# Patient Record
Sex: FEMALE | Race: WHITE
[De-identification: names, ages, dates, MRNs, and addresses within clinical notes are randomized per-mention and may not be internally consistent; named-entity substitution may affect disease eponyms.]

---

## 2018-01-10 ENCOUNTER — HOSPITAL ENCOUNTER (INPATIENT)
Dept: HOSPITAL 96 - M.ERS | Age: 78
LOS: 2 days | Discharge: HOME | DRG: 683 | End: 2018-01-12
Attending: INTERNAL MEDICINE | Admitting: INTERNAL MEDICINE
Payer: MEDICARE

## 2018-01-10 VITALS — SYSTOLIC BLOOD PRESSURE: 118 MMHG | DIASTOLIC BLOOD PRESSURE: 58 MMHG

## 2018-01-10 VITALS — SYSTOLIC BLOOD PRESSURE: 116 MMHG | DIASTOLIC BLOOD PRESSURE: 35 MMHG

## 2018-01-10 VITALS — HEIGHT: 60.98 IN | BODY MASS INDEX: 28.7 KG/M2 | WEIGHT: 152 LBS

## 2018-01-10 VITALS — SYSTOLIC BLOOD PRESSURE: 105 MMHG | DIASTOLIC BLOOD PRESSURE: 43 MMHG

## 2018-01-10 DIAGNOSIS — Z86.73: ICD-10-CM

## 2018-01-10 DIAGNOSIS — D72.829: ICD-10-CM

## 2018-01-10 DIAGNOSIS — Z79.899: ICD-10-CM

## 2018-01-10 DIAGNOSIS — Z79.82: ICD-10-CM

## 2018-01-10 DIAGNOSIS — D64.9: ICD-10-CM

## 2018-01-10 DIAGNOSIS — E03.9: ICD-10-CM

## 2018-01-10 DIAGNOSIS — E86.0: ICD-10-CM
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
## 2018-01-10 DIAGNOSIS — Z88.5: ICD-10-CM

## 2018-01-10 DIAGNOSIS — I10: ICD-10-CM

## 2018-01-10 DIAGNOSIS — R65.10: ICD-10-CM

## 2018-01-10 DIAGNOSIS — R61: ICD-10-CM
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
## 2018-01-10 DIAGNOSIS — I42.2: ICD-10-CM

## 2018-01-10 DIAGNOSIS — N17.9: Primary | ICD-10-CM
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
## 2018-01-10 DIAGNOSIS — Z90.710: ICD-10-CM

## 2018-01-10 DIAGNOSIS — I65.29: ICD-10-CM

## 2018-01-10 LAB
ABSOLUTE BASOPHILS: 0.2 THOU/UL (ref 0–0.2)
ABSOLUTE EOSINOPHILS: 0 THOU/UL (ref 0–0.7)
ABSOLUTE MONOCYTES: 1 THOU/UL (ref 0–1.2)
ALBUMIN SERPL-MCNC: 4.4 G/DL (ref 3.4–5)
ALP SERPL-CCNC: 56 U/L (ref 46–116)
ALT SERPL-CCNC: 27 U/L (ref 30–65)
ANION GAP SERPL CALC-SCNC: 11 MMOL/L (ref 7–16)
AST SERPL-CCNC: 31 U/L (ref 15–37)
BASOPHILS NFR BLD AUTO: 1.3 %
BILIRUB SERPL-MCNC: 0.6 MG/DL
BILIRUB UR-MCNC: NEGATIVE MG/DL
BUN SERPL-MCNC: 61 MG/DL (ref 7–18)
CALCIUM SERPL-MCNC: 10.2 MG/DL (ref 8.5–10.1)
CHLORIDE SERPL-SCNC: 102 MMOL/L (ref 98–107)
CO2 SERPL-SCNC: 28 MMOL/L (ref 21–32)
COLOR UR: YELLOW
CREAT SERPL-MCNC: 1.4 MG/DL (ref 0.6–1.3)
EOSINOPHIL NFR BLD: 0.3 %
GLUCOSE SERPL-MCNC: 111 MG/DL (ref 70–99)
GRANULOCYTES NFR BLD MANUAL: 65.4 %
HCT VFR BLD CALC: 35.3 % (ref 37–47)
HGB BLD-MCNC: 11.9 GM/DL (ref 12–15)
HYALINE CASTS #/AREA URNS LPF: (no result) /LPF
KETONES UR STRIP-MCNC: NEGATIVE MG/DL
LIPASE: 358 U/L (ref 73–393)
LYMPHOCYTES # BLD: 2.9 THOU/UL (ref 0.8–5.3)
LYMPHOCYTES NFR BLD AUTO: 24.8 %
MCH RBC QN AUTO: 31.3 PG (ref 26–34)
MCHC RBC AUTO-ENTMCNC: 33.7 G/DL (ref 28–37)
MCV RBC: 92.8 FL (ref 80–100)
MONOCYTES NFR BLD: 8.2 %
MPV: 9.9 FL. (ref 7.2–11.1)
NEUTROPHILS # BLD: 7.7 THOU/UL (ref 1.6–8.1)
NUCLEATED RBCS: 0 /100WBC
PLATELET COUNT*: 172 THOU/UL (ref 150–400)
POTASSIUM SERPL-SCNC: 3.9 MMOL/L (ref 3.5–5.1)
PROT SERPL-MCNC: 7.4 G/DL (ref 6.4–8.2)
PROT UR QL STRIP: NEGATIVE
RBC # BLD AUTO: 3.8 MIL/UL (ref 4.2–5)
RBC # UR STRIP: NEGATIVE /UL
RDW-CV: 13.6 % (ref 10.5–14.5)
SODIUM SERPL-SCNC: 141 MMOL/L (ref 136–145)
SP GR UR STRIP: 1.01 (ref 1–1.03)
SQUAMOUS: (no result) /LPF (ref 0–3)
URINE CLARITY: CLEAR
URINE GLUCOSE-RANDOM: NEGATIVE
URINE LEUKOCYTES-REFLEX: (no result)
URINE NITRITE-REFLEX: NEGATIVE
URINE WBC-REFLEX: (no result) /HPF (ref 0–5)
UROBILINOGEN UR STRIP-ACNC: 0.2 E.U./DL (ref 0.2–1)
WBC # BLD AUTO: 11.8 THOU/UL (ref 4–11)

## 2018-01-10 NOTE — 2DMMODE
[place] [address]
Phone:  [phone] 2 D/M-MODE ECHOCARDIOGRAM     
_______________________________________________________________________________
 
Name:         EDUARD LEMONS                 Room:          [address] [place] 
[facility]#:    [id number]     Account #:     [id number]  
Admission:    01/10/18    Attend Phys:   Power Souza, 
Discharge:                Date of Birth: [birth year]  
Date of Service: 01/10/18 1656  Report #:      [id number]
        [id number]
_______________________________________________________________________________
THIS REPORT FOR:  //name//                      
 
 
--------------- APPROVED REPORT --------------
 
 
Study performed:  01/10/2018 15:04:19
 
EXAM: Comprehensive 2D, Doppler, and color-flow 
Echocardiogram 
Patient Location: In-Patient   
Room #:  ER     Status:  routine
 
      BSA:         1.64
HR: 79 bpm BP:          104/40 mmHg 
Rhythm: NSR     
 
Other Information 
Study Quality: Good
 
Indications
Syncope 
 
2D Dimensions
IVSd:  18.64 (7-11mm) LVOT Diam:  18.66 (18-24mm) 
LVDd:  37.78 mm  
PWd:  10.81 (7-11mm) Ascending Ao:  32.20 (22-36mm)
LVDs:  17.54 (25-40mm) 
Aortic Root:  33.52 mm 
 
Volumes
Left Atrial Volume (Systole) 
    LA ESV Index:  38.50 mL/m2
 
Mitral Valve
MV Mean Gr.:  5.42 mmHg  E/A Ratio:  0.56
    MV Decel. Time:  403.80 ms
MV E Max Kp.:  1.11 m/s 
MV PHT:  117.10 ms  
MVA (PHT):  1.88 cm2  
 
TDI
E/Lateral E':  18.50 E/Medial E':  22.20
   Medial E' Kp.:  0.05 m/s
   Lateral E' Kp.:  0.06 m/s
 
 
 
[place] [address]
Phone:  [phone]                     2 D/M-MODE ECHOCARDIOGRAM     
_______________________________________________________________________________
 
Name:         EDUARD LEMONS                 Room:          [address] [place] 
[facility].#:    [id number]     Account #:     [id number]  
Admission:    01/10/18    Attend Phys:   Power Souza, 
Discharge:                Date of Birth: [birth year]  
Date of Service: 01/10/18 1656  Report #:      [id number]
        [id number]
_______________________________________________________________________________
Pulmonary Valve
PV Peak Kp.:  0.86 m/s PV Peak Gr.:  2.95 mmHg
 
Tricuspid Valve
TR Peak Gr.:  34.20 mmHg RVSP:  39.00 mmHg
 
Left Ventricle
The left ventricle is normal size. There is normal LV segmental wall 
motion. Moderate basal septal hypertrophy is present. Left 
ventricular outflow tract gradient is present. Moderate basal septal 
hypertrophy is present. Left ventricular systolic function is normal. 
The left ventricular ejection fraction is within the normal range. 
Left ventricular outflow tract gradient of 60 mm Hg LVEF is &gt;70%. 
Grade I - abnormal relaxation pattern.
 
Right Ventricle
The right ventricle is normal size. The right ventricular systolic 
function is normal.
 
Atria
Left atrium is mildly dilated. The right atrium size is 
normal.
 
Aortic Valve
Mild aortic valve sclerosis. No aortic regurgitation is present. 
There is no aortic valvular stenosis.
 
Mitral Valve
There is mitral annular calcification. There is systolic anterior 
motion of the mitral valve. Mild mitral regurgitation. No evidence of 
mitral valve stenosis.
 
Tricuspid Valve
The tricuspid valve is normal in structure. Mild tricuspid 
regurgitation. The RVSP is 35-40 mmHg.
 
Pulmonic Valve
The pulmonary valve is normal in structure. Trace pulmonic 
regurgitation.
 
Great Vessels
The aortic root is normal in size. IVC is normal in size and 
collapses with &gt;50% inspiration
 
Pericardium
There is no pericardial effusion.
 
 
[place] [address]
Phone:  [phone]                     2 D/M-MODE ECHOCARDIOGRAM     
_______________________________________________________________________________
 
Name:         EDUARD LEMONS                 Room:          [address] [place] 
[facility]#:    [id number]     Account #:     [id number]  
Admission:    01/10/18    Attend Phys:   Power Souza, 
Discharge:                Date of Birth: [birth year]  
Date of Service: 01/10/18 1656  Report #:      [id number]
        [id number]
_______________________________________________________________________________
 
&lt;Conclusion&gt;
Findings consistent with a hypertrophic cardiomyopathy
Moderate basal septal hypertrophy is present.
Left ventricular outflow tract gradient is present.
Left ventricular outflow tract gradient of 60 mm Hg
Left atrium is mildly dilated.
Mild aortic valve sclerosis.
Mild mitral regurgitation.
There is mitral annular calcification.
There is systolic anterior motion of the mitral valve.
Mild tricuspid regurgitation.
The RVSP is 35-40 mmHg.
 
 
 
 
 
 
 
 
 
 
 
 
 
 
 
 
 
 
 
 
 
 
 
 
 
 
 
 
 
 
 
  <ELECTRONICALLY SIGNED>
                                           By: Bhavin Scott MD, FACC      
  01/10/18     1656
D: 01/10/18 1656   _____________________________________
T: 01/10/18 1656   Bhavin Scott MD, [facility]        /INF

## 2018-01-10 NOTE — EKG
[place] [address]
Phone:  [phone]                     ELECTROCARDIOGRAM REPORT      
_______________________________________________________________________________
 
Name:       EDUARD LEMONS                  Room:           [address]    [place]M [place]  
.R.#:  [id number]      Account #:      [id number]  
Admission:  01/10/18     Attend Phys:    Power Souza MD 
Discharge:               Date of Birth:  [birth year]  
         Report #: [id number]
    [id number]
_______________________________________________________________________________
THIS REPORT FOR:  //name//                      
 
                         [facility] ED
                                       
Test Date:    2018-01-10               Test Time:    11:50:41
Pat Name:     EDUARD VESTA              Department:   
Patient ID:   [id number]            Room:         [facility]
Gender:       F                        Technician:   VICKIE YU
:          1940               Requested By: Alley Myers
Order Number: [id number] MD:   Bhavin Scott
                                 Measurements
Intervals                              Axis          
Rate:         87                       P:            48
RI:           162                      QRS:          -17
QRSD:         93                       T:            138
QT:           368                                    
QTc:          443                                    
                           Interpretive Statements
Sinus rhythm
Atrial premature complexes in couplets
LVH with secondary repolarization abnormality
No previous ECG available for comparison
 
Electronically Signed On 1- 16:24:48 CST by Bhavin Scott
[email or web address]
 
 
 
 
 
 
 
 
 
 
 
 
 
 
 
 
 
 
  <ELECTRONICALLY SIGNED>
                                           By: Bhavin Scott MD, [facility]      
  01/10/18     1624
D: 01/10/18 1150   _____________________________________
T: 01/10/18 1150   Bhavin Scott MD, [facility]        /EPI

## 2018-01-11 VITALS — DIASTOLIC BLOOD PRESSURE: 44 MMHG | SYSTOLIC BLOOD PRESSURE: 112 MMHG

## 2018-01-11 VITALS — DIASTOLIC BLOOD PRESSURE: 38 MMHG | SYSTOLIC BLOOD PRESSURE: 105 MMHG

## 2018-01-11 VITALS — SYSTOLIC BLOOD PRESSURE: 95 MMHG | DIASTOLIC BLOOD PRESSURE: 33 MMHG

## 2018-01-11 VITALS — DIASTOLIC BLOOD PRESSURE: 48 MMHG | SYSTOLIC BLOOD PRESSURE: 142 MMHG

## 2018-01-11 VITALS — DIASTOLIC BLOOD PRESSURE: 43 MMHG | SYSTOLIC BLOOD PRESSURE: 115 MMHG

## 2018-01-11 VITALS — SYSTOLIC BLOOD PRESSURE: 105 MMHG | DIASTOLIC BLOOD PRESSURE: 38 MMHG

## 2018-01-11 VITALS — SYSTOLIC BLOOD PRESSURE: 107 MMHG | DIASTOLIC BLOOD PRESSURE: 36 MMHG

## 2018-01-11 LAB
ABSOLUTE BASOPHILS: 0 THOU/UL (ref 0–0.2)
ABSOLUTE EOSINOPHILS: 0.1 THOU/UL (ref 0–0.7)
ABSOLUTE MONOCYTES: 0.5 THOU/UL (ref 0–1.2)
ANION GAP SERPL CALC-SCNC: 4 MMOL/L (ref 7–16)
BASOPHILS NFR BLD AUTO: 0.5 %
BUN SERPL-MCNC: 39 MG/DL (ref 7–18)
CALCIUM SERPL-MCNC: 8.2 MG/DL (ref 8.5–10.1)
CHLORIDE SERPL-SCNC: 111 MMOL/L (ref 98–107)
CO2 SERPL-SCNC: 28 MMOL/L (ref 21–32)
CREAT SERPL-MCNC: 1 MG/DL (ref 0.6–1.3)
EOSINOPHIL NFR BLD: 2.2 %
GLUCOSE SERPL-MCNC: 99 MG/DL (ref 70–99)
GRANULOCYTES NFR BLD MANUAL: 55.6 %
HCT VFR BLD CALC: 24.1 % (ref 37–47)
HGB BLD-MCNC: 8.3 GM/DL (ref 12–15)
IRON SERPL-MCNC: 44 UG/DL (ref 50–175)
LYMPHOCYTES # BLD: 2.1 THOU/UL (ref 0.8–5.3)
LYMPHOCYTES NFR BLD AUTO: 33.7 %
MCH RBC QN AUTO: 31.9 PG (ref 26–34)
MCHC RBC AUTO-ENTMCNC: 34.5 G/DL (ref 28–37)
MCV RBC: 92.3 FL (ref 80–100)
MONOCYTES NFR BLD: 8 %
MPV: 9.4 FL. (ref 7.2–11.1)
NEUTROPHILS # BLD: 3.5 THOU/UL (ref 1.6–8.1)
NUCLEATED RBCS: 0 /100WBC
PLATELET COUNT*: 96 THOU/UL (ref 150–400)
POTASSIUM SERPL-SCNC: 4 MMOL/L (ref 3.5–5.1)
RBC # BLD AUTO: 2.61 MIL/UL (ref 4.2–5)
RDW-CV: 13.3 % (ref 10.5–14.5)
SAO2 % BLD FROM PO2: 23 % (ref 20–39)
SODIUM SERPL-SCNC: 143 MMOL/L (ref 136–145)
TROPONIN-I LEVEL: <0.06 NG/ML (ref ?–0.06)
WBC # BLD AUTO: 6.3 THOU/UL (ref 4–11)

## 2018-01-11 NOTE — CON
[address]  [address]                    CONSULTATION                  
_______________________________________________________________________________
 
Name:       DEUARD LEMONS                  Room:           [address]    [place]  
M.R.#:  [id number]      Account #:      [id number]  
Admission:  01/10/18     Attend Phys:    Power Souza MD 
Discharge:               Date of Birth:  [birth year]  
         Report #: [id number]
                                                                     [id number]  
_______________________________________________________________________________
THIS REPORT FOR:  //name//                      
 
CC: Power Alex
 
DATE OF SERVICE:  01/10/2018
 
 
HISTORY OF PRESENT ILLNESS:  The patient is a 77-year-old  white female
who I was asked to see in the hospital after she had a syncopal spell.  The
patient states that she has been told she had a heart murmur for years. 
However, she has never seen a cardiologist.  She stays fairly active.  She was
doing well until this morning she woke up at 5 in the morning, she felt sweaty
and weak.  She started walking through her room, but felt lightheaded, had to
sit down in a chair.  She felt nauseated, but did not vomit.  Stomach felt
somewhat uncomfortable.  She went back to bed.  She did get up to the bath about
mid day, sat on the toilet.  She felt diaphoretic and then lost conscious.  She
apparently fell to the floor and hit her head.  Her  got her back to bed.
 She was taken by her  to see her primary care physician.  He sent her to
the Emergency Room for further evaluation and treatment.  She denied any recent
vomiting, bleeding.  She denied any chest pain, seizure activity.  She does note
occasional flutter in her chest.
 
PAST MEDICAL HISTORY:  Significant for appendectomy, hysterectomy,
cholecystectomy, tonsillectomy, cataract extraction.  She apparently had a
stroke in the past with some left-sided weakness, has been on Plavix.  She has a
history of hyperlipidemia.  No history of hypertension.
 
MEDICATIONS:  Consists of Lipitor, Atacand, HCT, aspirin, Synthroid.
 
ALLERGIES:  She has intolerance TO CODEINE and MORPHINE.
 
FAMILY HISTORY:  Her brother  of heart attack.
 
SOCIAL HISTORY:  She is .  She and her  live in [place]. 
Quit smoking years ago.  No alcohol abuse.
 
REVIEW OF SYSTEMS:  She has had no history of asthma, peptic ulcer disease,
liver disease, kidney disease, cancer, psychiatric illness.
 
PHYSICAL EXAMINATION:
GENERAL:  Revealed an elderly female lying in bed.  She appeared in no distress.
VITAL SIGNS:  She had a blood pressure of 120/60, pulse is 80, she is afebrile.
HEENT:  She is anicteric, conjunctiva pink.  Mucous members moist.
NECK:  Veins do not appear distended.  There are bilateral ____ murmur noted in
 
 
 
[place] [address]                    CONSULTATION                  
_______________________________________________________________________________
 
Name:       EDUARD LEMONS                  Room:           [address] [place]  
[facility]#:  [id number]      Account #:      [id number]  
Admission:  01/10/18     Attend Phys:    Power Souza MD 
Discharge:               Date of Birth:  [birth year]  
         Report #: [id number]
                                                                     [id number]  
_______________________________________________________________________________
both carotids.  Neck is supple.
CHEST:  Clear to auscultation.
CARDIAC:  Regular rate with grade 3 holosystolic murmur at the apex.
ABDOMEN:  Soft.
EXTREMITIES:  Had no edema.  Dorsalis pedis pulse 1+ bilaterally.
SKIN:  Cool and dry.
NEUROLOGIC:  Nonfocal.
LYMPH:  No adenopathy.
MUSCULOSKELETAL:  No joint effusions.
 
LABORATORY DATA:  Her ECG showed a sinus rhythm, left ventricular hypertrophy
with repolarization changes.
 
Workup in the Emergency Room today, she had portable chest x-ray that showed
normal heart size and clear lung fields.  CT scan of the head done without
contrast showed some volume loss, white matter changes, old infarction in the
right basal ganglia.  Previous carotid Doppler study in  showed a calcified
plaque, 50-60% stenosis.
 
Her workup in the Emergency Room today, sodium 141, creatinine 1.4, BUN 61. 
Liver function studies were normal.  TSH 0.98.  White blood cell count 9.8,
hemoglobin 11.9.
 
IMPRESSION AND RECOMMENDATIONS:
1.  Syncope.  Reason unclear.  I would monitor the patient.  No history to
suggest seizure.
2.  Murmur of mitral regurgitation.  Recommend echocardiogram.
3.  Carotid bruit.  Recommend Doppler.
4.  Hypertension.  The patient has been on an ARB and diuretic.
5.  Hyperlipidemia.  The patient is on a statin drug.
6.  Previous stroke.  The patient is on Plavix.
 
 
 
 
 
 
 
 
 
 
 
 
 
<ELECTRONICALLY SIGNED>
                                        By:  Bhavin Scott MD, FACC      
[birth year]     1936
D: 01/10/18 1609_______________________________________
T: 01/10/18 2031Dnhung Scott MD, FACC         /nt

## 2018-01-12 VITALS — DIASTOLIC BLOOD PRESSURE: 35 MMHG | SYSTOLIC BLOOD PRESSURE: 116 MMHG

## 2018-01-12 VITALS — SYSTOLIC BLOOD PRESSURE: 116 MMHG | DIASTOLIC BLOOD PRESSURE: 35 MMHG

## 2018-01-12 VITALS — DIASTOLIC BLOOD PRESSURE: 61 MMHG | SYSTOLIC BLOOD PRESSURE: 136 MMHG

## 2018-01-12 VITALS — SYSTOLIC BLOOD PRESSURE: 122 MMHG | DIASTOLIC BLOOD PRESSURE: 36 MMHG

## 2018-01-12 LAB
ABSOLUTE BASOPHILS: 0 THOU/UL (ref 0–0.2)
ABSOLUTE EOSINOPHILS: 0.2 THOU/UL (ref 0–0.7)
ABSOLUTE MONOCYTES: 0.4 THOU/UL (ref 0–1.2)
ANION GAP SERPL CALC-SCNC: 7 MMOL/L (ref 7–16)
BASOPHILS NFR BLD AUTO: 0.7 %
BUN SERPL-MCNC: 25 MG/DL (ref 7–18)
CALCIUM SERPL-MCNC: 8 MG/DL (ref 8.5–10.1)
CHLORIDE SERPL-SCNC: 112 MMOL/L (ref 98–107)
CO2 SERPL-SCNC: 25 MMOL/L (ref 21–32)
CREAT SERPL-MCNC: 0.9 MG/DL (ref 0.6–1.3)
EOSINOPHIL NFR BLD: 3.2 %
GLUCOSE SERPL-MCNC: 109 MG/DL (ref 70–99)
GRANULOCYTES NFR BLD MANUAL: 58.4 %
HCT VFR BLD CALC: 23.1 % (ref 37–47)
HGB BLD-MCNC: 8 GM/DL (ref 12–15)
LYMPHOCYTES # BLD: 1.7 THOU/UL (ref 0.8–5.3)
LYMPHOCYTES NFR BLD AUTO: 30 %
MCH RBC QN AUTO: 32.1 PG (ref 26–34)
MCHC RBC AUTO-ENTMCNC: 34.6 G/DL (ref 28–37)
MCV RBC: 92.7 FL (ref 80–100)
MONOCYTES NFR BLD: 7.7 %
MPV: 9.7 FL. (ref 7.2–11.1)
NEUTROPHILS # BLD: 3.3 THOU/UL (ref 1.6–8.1)
NUCLEATED RBCS: 0 /100WBC
PLATELET COUNT*: 84 THOU/UL (ref 150–400)
POTASSIUM SERPL-SCNC: 3.9 MMOL/L (ref 3.5–5.1)
RBC # BLD AUTO: 2.49 MIL/UL (ref 4.2–5)
RDW-CV: 13.2 % (ref 10.5–14.5)
SODIUM SERPL-SCNC: 144 MMOL/L (ref 136–145)
WBC # BLD AUTO: 5.7 THOU/UL (ref 4–11)

## 2018-01-19 NOTE — CON
[address]  [address]                    CONSULTATION                  
_______________________________________________________________________________
 
Name:       EDUARD LEMONS                  Room:           [address] [place]  
M.R.#:  [id number]      Account #:      [id number]  
Admission:  01/10/18     Attend Phys:    Power Souza MD 
Discharge:  01/12/18     Date of Birth:  [birth year]  
         Report #: [id number]
                                                                     [id number]  
_______________________________________________________________________________
THIS REPORT FOR:  //name//                      
 
CC: Power Alex
 
DATE OF SERVICE:  01/11/2018
 
 
HISTORY OF PRESENT ILLNESS:  This is a 77-year-old female patient who was
evaluated by me for an episode of syncope.  This patient gives a history that
she was on the toilet and felt hot and then felt dizzy and then she passed out. 
She does not know whether it was associated with tonic-clonic activity.  She did
hit her head and she does have a bruise there.  She feels back to her baseline. 
She never had this kind of episode before, but she did have some other episodes,
which will be described in review of systems.
 
REVIEW OF SYSTEMS:  Indicate that at one time she had an episode where she lost
memory.  She still had trouble with the names and she was diagnosed with a
stroke.  She does not know when it happened, but it was long time ago.  She also
had one stroke-like symptom where she had some symptoms on the left side and
that resolved by itself.  Again, she was diagnosed _____ that time.  She denies
any history of migraine headaches in the past.  She feels back to her baseline
at the moment.  She does have some bruise on her forehead, but she does not
believe it is hurting her.  She does have a history of hysterectomy and
hypothyroidism in the past.  Otherwise, she indicates she is pretty healthy. 
She still has difficulty with the names.  She denies any new eye, ENT, cardiac,
respiratory, GI, , musculoskeletal, constitutional, dermatological,
hematological, psychiatric, endocrine, throat, allergic symptom associated with
present symptomatology.
 
PAST MEDICAL HISTORY:  Positive for couple of episodes, which has been described
above.
 
FAMILY HISTORY:  Negative for early age stroke.
 
SOCIAL HISTORY:  She does not smoke or drink any alcohol.
 
PHYSICAL EXAMINATION:  The patient's examinations indicate that she is alert. 
She is responsive.  She can follow simple commands.  She still has trouble with
the name.  Her speech otherwise looks intact and rest of the memory and fund of
knowledge looks intact.  Cranial nerve examination 2-12 is mostly unremarkable. 
She has symmetrical strength, sensation, reflexes and tone in all 4 extremities.
 There is no papilledema.  There is no cerebellar sign.  There is no meningeal
sign in this patient.
 
Her pulses are palpable.  She has no edema, cyanosis, or jaundice.  She is a
 
 
 
[place] [address]                    CONSULTATION                  
_______________________________________________________________________________
 
Name:       EDUARD LEMONS                  Room:           [address] [place]  
M.R.#:  [id number]      Account #:      [id number]  
Admission:  01/10/18     Attend Phys:    Power Souza MD 
Discharge:  01/12/18     Date of Birth:  [birth year]  
         Report #: [id number]
                                                                     [id number]  
_______________________________________________________________________________
well-developed individual who has hearing and vision intact.  There is no
carotid bruit or thyroid mass.  Cardiac examinations indicate heart sounds are
unremarkable and she does not appear to have any murmur or arrhythmias.  There
is no respiratory difficulty or rhonchi.  Vital signs indicate a blood pressure
_____, respirations 18, pulse is 77, and temperature is 97.8.
 
LABORATORY DATA:  Her hemoglobin has dropped from 11.9 to 8.3 and her platelet
has down from 172 to 96.
 
IMPRESSION:  This patient is a complicated case.  She had multiple spells with
stroke-like symptoms in the past, which need to be worked up.  However, for the
present episode, the etiology looks more systemic.  She has a significant drop
in her hemoglobin and it is possible that she is having some gastrointestinal
bleed at some place and that is causing her symptoms like this, this can also be
a cardiac cause.
 
RECOMMENDATIONS:
1.  I will do the neurology workup by doing MRI and EEG.
2.  I will suggest systemic workup, especially cardiac workup as well as workup
to determine any loss of blood in this patient.
3.  Her history is pretty impressive for neurological symptoms in the past, but
present episode does not appear to be because of that, but we will check the
workup and follow up with you.
 
I discussed all of it with the patient in detail and she wants to follow this
plan.  Dr. Alexis will follow up this patient with you from tomorrow.
 
Thank you very much for this referral.
 
 
 
 
 
 
 
 
 
 
 
 
 
 
 
 
<ELECTRONICALLY SIGNED>
                                        By:  Raheel De La Torre MD             
01/19/18 2010
D: 01/11/18 1008_______________________________________
T: 01/11/18 1749Raheel De La Torre MD                /nt

## 2018-01-25 ENCOUNTER — HOSPITAL ENCOUNTER (OUTPATIENT)
Dept: HOSPITAL 96 - M.LAB | Age: 78
End: 2018-01-25
Attending: GENERAL PRACTICE
Payer: MEDICARE

## 2018-01-25 DIAGNOSIS — N17.9: ICD-10-CM

## 2018-01-25 DIAGNOSIS — D64.9: Primary | ICD-10-CM

## 2018-01-25 LAB
ANION GAP SERPL CALC-SCNC: 7 MMOL/L (ref 7–16)
BUN SERPL-MCNC: 29 MG/DL (ref 7–18)
CALCIUM SERPL-MCNC: 9.4 MG/DL (ref 8.5–10.1)
CHLORIDE SERPL-SCNC: 106 MMOL/L (ref 98–107)
CO2 SERPL-SCNC: 30 MMOL/L (ref 21–32)
CREAT SERPL-MCNC: 1.2 MG/DL (ref 0.6–1.3)
GLUCOSE SERPL-MCNC: 117 MG/DL (ref 70–99)
HCT VFR BLD CALC: 29.3 % (ref 37–47)
HGB BLD-MCNC: 9.9 GM/DL (ref 12–15)
MCH RBC QN AUTO: 31.7 PG (ref 26–34)
MCHC RBC AUTO-ENTMCNC: 33.9 G/DL (ref 28–37)
MCV RBC: 93.4 FL (ref 80–100)
MPV: 8.3 FL. (ref 7.2–11.1)
PLATELET COUNT*: 137 THOU/UL (ref 150–400)
POTASSIUM SERPL-SCNC: 4.1 MMOL/L (ref 3.5–5.1)
RBC # BLD AUTO: 3.14 MIL/UL (ref 4.2–5)
RDW-CV: 14.6 % (ref 10.5–14.5)
SODIUM SERPL-SCNC: 143 MMOL/L (ref 136–145)
WBC # BLD AUTO: 3.9 THOU/UL (ref 4–11)

## 2018-03-09 ENCOUNTER — HOSPITAL ENCOUNTER (OUTPATIENT)
Dept: HOSPITAL 96 - M.CRD | Age: 78
End: 2018-03-09
Attending: INTERNAL MEDICINE
Payer: MEDICARE

## 2018-03-09 DIAGNOSIS — K92.1: ICD-10-CM

## 2018-03-09 DIAGNOSIS — D50.9: Primary | ICD-10-CM

## 2018-03-09 DIAGNOSIS — R55: ICD-10-CM

## 2018-03-09 NOTE — EXE
[place] [address]
Phone:  [phone]                     STRESS ECHOCARDIOGRAM         
_______________________________________________________________________________
 
Name:         EDUARD LEMONS                 Room:                     [facility]
M.R.#:    [id number]     Account #:     [id number]  
Admission:    03/09/18    Attend Phys:   Bhavin Scott MD
Discharge:                Date of Birth: [birth year]  
Date of Service: 03/09/18 1538  Report #:      [id number]
        [id number]
_______________________________________________________________________________
THIS REPORT FOR:  //name//                      
 
 
--------------- APPROVED REPORT --------------
 
 
Exam:  Dobutamine Stress Echo
Indication: Syncope
Patient Location: Out-Patient
Stress Nurse: Nadine Todd RN
Supervising Physician: Bhavin Scott MD
Status: routine
 
Ht: 5 ft 1 in      
HR: 77 bpm       BP: 129/69 mmHg 
Rhythm: NSR    
 
Medical History
Cardiac Risk Factors: HTN, Hyperlipidemia
 
Procedure
The patient underwent a Pharmacological Stress Test using Dobutamine. 
Blood pressure, heart rate, and EKG were monitored.
An Echocardiogram was performed by technician in four stages in quad 
fashion.  At peak stress, four selected images were obtained and 
placed side by side with resting images for comparison.
 
Stress Test Details
Stress Test:  Pharmacological Stress Test using Dobutamine.      
  Reason for pharmacologic stress test: physical 
limitation.      
HR           
Resting HR:            77 bpm   Max Heart Rate (APMHR): 141 bpm  
Max HR Achieved:  145 bpm   Target HR (85% APMHR): 119 bpm  
% of APMHR:         102         
Recovery HR:            88 bpm        
HR response to stress: Normal HR response to stress      
 
BP           
Resting BP:  129/69 mmHg        
Max BP:       120/63 mmHg        
Recovery BP:       133/50 mmHg        
 
ECG           
Resting ECG:  Sinus Rhythm, nonspecific ST-T 
abnormalities      
 
 
[place] [address]
Phone:  [phone]                     STRESS ECHOCARDIOGRAM         
_______________________________________________________________________________
 
Name:         EDUARD LEMONS                 Room:                     REG [facility]
M.R.#:    [id number]     Account #:     [id number]  
Admission:    03/09/18    Attend Phys:   Bhavin Scott MD
Discharge:                Date of Birth: [birth year]  
Date of Service: 03/09/18 1538  Report #:      [id number]
        [id number]
_______________________________________________________________________________
Stress ECG:     Sinus Rhythm, nonspecific ST-T abnormalities      
ST Change: Downsloping ST depression       
Maximum ST Deviation: 1 mm        
Recovery ECG: Sinus Rhythm, NSSTT changes      
Recovery ST Change: Downsloping ST depression      
Recovery ST Deviation: 1 mm        
 
Clinical
Reason for Termination: Completed protocol
 
Pre-Stress Echo
The resting Echocardiogram showed normal left ventricular 
contractility with an estimated Ejection Fraction of about 60-65%. 
 
Post-Stress Echo
The stress Echocardiogram showed normal left ventricular 
contractility with an estimated Ejection Fraction of about &gt;70%. 
 
Conclusion
Clinical Response:  Non-ischemic
Stress ECG Response:  Indeterminant
Stress Echo Images:  Non-ischemic
low risk dobutamine stress echo for future cardiac events
 
Other Information
Study Quality: Excellent
 
&lt;Conclusion&gt;
low risk dobutamine stress echo for future cardiac events
 
 
 
 
 
 
 
 
 
 
 
 
 
 
 
  <ELECTRONICALLY SIGNED>
                                           By: Bhavin Scott MD, FACC      
  03/09/18     1538
D: 03/09/18 1538   _____________________________________
T: 03/09/18 1538   Bhavin Scott MD, [facility]        /INF

## 2018-03-12 ENCOUNTER — HOSPITAL ENCOUNTER (OUTPATIENT)
Dept: HOSPITAL 96 - M.LAB | Age: 78
End: 2018-03-12
Attending: INTERNAL MEDICINE
Payer: MEDICARE

## 2018-03-12 DIAGNOSIS — D50.9: Primary | ICD-10-CM

## 2018-03-12 DIAGNOSIS — K92.1: ICD-10-CM

## 2018-03-12 LAB
ABSOLUTE BASOPHILS: 0.1 THOU/UL (ref 0–0.2)
ABSOLUTE EOSINOPHILS: 0.1 THOU/UL (ref 0–0.7)
ABSOLUTE MONOCYTES: 0.4 THOU/UL (ref 0–1.2)
ALBUMIN SERPL-MCNC: 3.8 G/DL (ref 3.4–5)
ALP SERPL-CCNC: 64 U/L (ref 46–116)
ALT SERPL-CCNC: 19 U/L (ref 30–65)
ANION GAP SERPL CALC-SCNC: 10 MMOL/L (ref 7–16)
AST SERPL-CCNC: 21 U/L (ref 15–37)
BASOPHILS NFR BLD AUTO: 2.3 %
BILIRUB SERPL-MCNC: 0.3 MG/DL
BUN SERPL-MCNC: 23 MG/DL (ref 7–18)
CALCIUM SERPL-MCNC: 9.6 MG/DL (ref 8.5–10.1)
CHLORIDE SERPL-SCNC: 106 MMOL/L (ref 98–107)
CO2 SERPL-SCNC: 29 MMOL/L (ref 21–32)
CREAT SERPL-MCNC: 1.1 MG/DL (ref 0.6–1.3)
EOSINOPHIL NFR BLD: 1.8 %
ESR (SEDRATE): 35 MM/HR (ref 0–30)
GLUCOSE SERPL-MCNC: 88 MG/DL (ref 70–99)
GRANULOCYTES NFR BLD MANUAL: 53.2 %
HCT VFR BLD CALC: 34.9 % (ref 37–47)
HGB BLD-MCNC: 11.6 GM/DL (ref 12–15)
LYMPHOCYTES # BLD: 1.4 THOU/UL (ref 0.8–5.3)
LYMPHOCYTES NFR BLD AUTO: 32.2 %
MCH RBC QN AUTO: 29.9 PG (ref 26–34)
MCHC RBC AUTO-ENTMCNC: 33.2 G/DL (ref 28–37)
MCV RBC: 90 FL (ref 80–100)
MONOCYTES NFR BLD: 10.5 %
MPV: 9.6 FL. (ref 7.2–11.1)
NEUTROPHILS # BLD: 2.2 THOU/UL (ref 1.6–8.1)
NUCLEATED RBCS: 0 /100WBC
PLATELET COUNT*: 123 THOU/UL (ref 150–400)
POTASSIUM SERPL-SCNC: 3.8 MMOL/L (ref 3.5–5.1)
PROT SERPL-MCNC: 7 G/DL (ref 6.4–8.2)
RBC # BLD AUTO: 3.88 MIL/UL (ref 4.2–5)
RDW-CV: 13.6 % (ref 10.5–14.5)
SODIUM SERPL-SCNC: 145 MMOL/L (ref 136–145)
WBC # BLD AUTO: 4.2 THOU/UL (ref 4–11)

## 2018-09-04 ENCOUNTER — HOSPITAL ENCOUNTER (OUTPATIENT)
Dept: HOSPITAL 96 - M.CRD | Age: 78
End: 2018-09-04
Attending: NURSE PRACTITIONER
Payer: MEDICARE

## 2018-09-04 DIAGNOSIS — I10: ICD-10-CM

## 2018-09-04 DIAGNOSIS — I08.1: Primary | ICD-10-CM

## 2018-09-04 DIAGNOSIS — I42.1: ICD-10-CM

## 2018-09-04 NOTE — 2DMMODE
[place] [address]
Phone:  [phone] 2 D/M-MODE ECHOCARDIOGRAM     
_______________________________________________________________________________
 
Name:         EDUARD LEMONS                 Room:                     REG CLI
M.R.#:    [id number]     Account #:     [id number]  
Admission:    18    Attend Phys:   Naty Arceo,
Discharge:                Date of Birth: [birth year]  
Date of Service: 18 1518  Report #:      [id number]
        [id number]
_______________________________________________________________________________
THIS REPORT FOR:  //name//                      
 
 
--------------- ADDENDUM APPROVED REPORT --------------
 
 
Study performed:  2018 14:02:09
 
EXAM: Comprehensive 2D, Doppler, and color-flow 
Echocardiogram 
Patient Location: Out-Patient   
      Status:  routine
 
      BSA:         1.63
HR: 76 bpm BP:          120/68 mmHg 
 
Other Information 
Study Quality: Good
 
Indications
HOCM
 
2D Dimensions
   LVEF(%):  81.32 (&gt;50%)
IVSd:  17.29 (7-11mm) LVOT Diam:  19.97 (18-24mm) 
LVDd:  37.11 mm  
PWd:  10.46 (7-11mm) Ascending Ao:  32.23 (22-36mm)
LVDs:  18.85 (25-40mm) 
Aortic Root:  26.99 mm 
   Allen's LVEF:  81.32 %
 
Volumes
Left Atrial Volume (Systole) 
    LA ESV Index:  17.40 mL/m2
 
Aortic Valve
AoV Peak Kp.:  5.48 m/s 
AO Peak Gr.:  119.93 mmHg LVOT Max P.51 mmHg
AO Mean Gr.:  69.37 mmHg LVOT Mean P.39 mmHg
    LVOT Max V:  2.15 m/s
AO V2 VTI:  91.85 cm  LVOT Mean V:  1.27 m/s
FABIOLA (VTI):  1.60 cm2  LVOT V1 VTI:  46.95 cm
 
Mitral Valve
MV Peak Gr.:  17.47 mmHg 
MV Mean Gr.:  7.27 mmHg  E/A Ratio:  0.56
 
 
[place] [address]
Phone:  [phone]                     2 D/M-MODE ECHOCARDIOGRAM     
_______________________________________________________________________________
 
Name:         EDUARD LEMONS                 Room:                     REG CLI
M.R.#:    [id number]     Account #:     [id number]  
Admission:    18    Attend Phys:   Naty Arceo,
Discharge:                Date of Birth: [birth year]  
Date of Service: 18 1518  Report #:      [id number]
        [id number]
_______________________________________________________________________________
    MV Decel. Time:  329.44 ms
MV E Max Kp.:  0.98 m/s 
MV PHT:  95.54 ms  
MVA (PHT):  2.30 cm2  
 
TDI
E/Lateral E':  14.00 E/Medial E':  14.00
   Medial E' Kp.:  0.07 m/s
   Lateral E' Kp.:  0.07 m/s
 
Pulmonary Valve
PV Peak Kp.:  0.78 m/s PV Peak Gr.:  2.45 mmHg
 
Tricuspid Valve
    RAP Estimate:  5.00 mmHg
TR Peak Gr.:  27.68 mmHg RVSP:  32.68 mmHg
    PA Pressure:  32.68 mmHg
 
Left Ventricle
The left ventricle is normal size. There is normal LV segmental wall 
motion. Echo findings are consistent with hypertrophic 
cardiomyopathy. Moderate basal septal hypertrophy is present. Left 
ventricular outflow tract gradient is present. Moderate basal septal 
hypertrophy is present. Left ventricular outflow tract gradient is 
present. Left ventricular systolic function is normal. LVEF is 
&gt;70%. Grade I - abnormal relaxation pattern.
 
Right Ventricle
The right ventricle is normal size. The right ventricular systolic 
function is normal.
 
Atria
The left atrium size is normal. The right atrium size is 
normal.
 
Aortic Valve
Aortic valve is mildly calcified. No aortic regurgitation is present. 
There is no aortic valvular stenosis.
 
Mitral Valve
There is mitral annular calcification. Moderate mitral regurgitation. 
No evidence of mitral valve stenosis.
 
Tricuspid Valve
The tricuspid valve is normal in structure. Mild tricuspid 
regurgitation. The RVSP is 30-35 mmHg.
 
 
[place] [address]
Phone:  [phone]                     2 D/M-MODE ECHOCARDIOGRAM     
_______________________________________________________________________________
 
Name:         EDUARD LEMONS                 Room:                     REG CLI
M.RMonet#:    [id number]     Account #:     [id number]  
Admission:    18    Attend Phys:   Naty Arceo,
Discharge:                Date of Birth: [birth year]  
Date of Service: 18 1518  Report #:      [id number]
        [id number]
_______________________________________________________________________________
 
Pulmonic Valve
The pulmonary valve is normal in structure. Mild pulmonic 
regurgitation.
 
Great Vessels
The aortic root is normal in size. IVC is normal in size and 
collapses with &gt;50% inspiration
 
Pericardium
There is no pericardial effusion.
 
&lt;Conclusion&gt;
The left ventricle is normal size.
 
Echo findings are consistent with hypertrophic 
cardiomyopathy.
Moderate basal septal hypertrophy is present.
Left ventricular outflow tract gradient is present.
Moderate basal septal hypertrophy is present.
Left ventricular outflow tract gradient is present.  Mean gradient 74 
mmHg.
 
Left ventricular systolic function is normal.
LVEF is &gt;70%.
Grade I - abnormal relaxation pattern.
Aortic valve is mildly calcified.
Moderate mitral regurgitation.
Mild tricuspid regurgitation.
The RVSP is 30-35 mmHg.
Mild pulmonic regurgitation.
 
 
 
 
 
 
 
 
 
 
 
 
 
  <ELECTRONICALLY SIGNED>
                                           By: Joshua Robison MD, FACC   
  18     1518
D: 18 1518   _____________________________________
T: 18 1518   Joshua Robison MD, FACC     /INF

## 2021-05-25 ENCOUNTER — HOSPITAL ENCOUNTER (OUTPATIENT)
Dept: HOSPITAL 96 - M.CRD | Age: 81
End: 2021-05-25
Attending: INTERNAL MEDICINE
Payer: MEDICARE

## 2021-05-25 DIAGNOSIS — I08.8: Primary | ICD-10-CM

## 2021-05-25 DIAGNOSIS — I08.1: ICD-10-CM

## 2021-05-25 NOTE — 2DMMODE
[place] [address]
Phone:  [phone] 2 D/M-MODE ECHOCARDIOGRAM     
_______________________________________________________________________________
 
Name:         EDUARD LEMONS                 Room:                     REG CLI
M.R.#:    [id number]     Account #:     [id number]  
Admission:    21    Attend Phys:   Bhavin Scott MD
Discharge:                Date of Birth: [birth year]  
Date of Service: 21 1537  Report #:      [id number]
        [id number]
_______________________________________________________________________________
THIS REPORT FOR:
 
cc:  REJI ARANGO MD, HEATHER L. MD Liston, Michael J. MD Located within [facility]     
                                                                       ~
 
--------------- APPROVED REPORT --------------
 
 
Study performed:  2021 12:42:39
 
EXAM: Comprehensive 2D, Doppler, and color-flow 
Echocardiogram 
Patient Location: Out-Patient   
 
      BSA:         1.62
HR: 74 bpm BP:          119/57 mmHg 
 
Other Information 
Study Quality: Good
 
Indications
Murmur
 
2D Dimensions
IVSd:  15.26 (7-11mm) LVOT Diam:  20.93 (18-24mm) 
LVDd:  38.10 mm  
PWd:  12.37 (7-11mm) Ascending Ao:  30.18 (22-36mm)
LVDs:  22.65 (25-40mm) 
Aortic Root:  30.81 mm 
 
Volumes
Left Atrial Volume (Systole) 
    LA ESV Index:  21.10 mL/m2
 
Aortic Valve
AoV Peak Kp.:  4.16 m/s 
AO Peak Gr.:  69.24 mmHg LVOT Max PG:  15.63 mmHg
AO Mean Gr.:  34.25 mmHg LVOT Mean P.72 mmHg
    LVOT Max V:  1.98 m/s
AO V2 VTI:  47.38 cm  LVOT Mean V:  1.38 m/s
FABIOLA (VTI):  3.26 cm2  LVOT V1 VTI:  44.89 cm
 
Mitral Valve
    E/A Ratio:  0.66
 
 
[place] [address]
Phone:  [phone]                     2 D/M-MODE ECHOCARDIOGRAM     
_______________________________________________________________________________
 
Name:         EDUARD LEMONS                 Room:                     REG CLI
M.R.#:    [id number]     Account #:     [id number]  
Admission:    21    Attend Phys:   Bhavin Scott MD
Discharge:                Date of Birth: [birth year]  
Date of Service: 21 1537  Report #:      [id number]
        [id number]
_______________________________________________________________________________
    MV Decel. Time:  356.37 ms
MV E Max Kp.:  1.11 m/s 
MV PHT:  103.35 ms  
MVA (PHT):  2.13 cm2  
 
TDI
E/Lateral E':  22.20 E/Medial E':  22.20
   Medial E' Kp.:  0.05 m/s
   Lateral E' Kp.:  0.05 m/s
 
Pulmonary Valve
PV Peak Kp.:  0.80 m/s PV Peak Gr.:  2.56 mmHg
 
Tricuspid Valve
    RAP Estimate:  5.00 mmHg
TR Peak Gr.:  27.45 mmHg RVSP:  32.45 mmHg
    PA Pressure:  32.45 mmHg
 
Left Ventricle
The left ventricle is normal size. There is normal LV segmental wall 
motion. Left ventricular outflow tract gradient is present. Mild to 
moderate concentric left ventricular hypertrophy. Left ventricular 
systolic function is vigorous. LVEF is >70%. Grade I - abnormal 
relaxation pattern.
 
Right Ventricle
The right ventricle is normal size. The right ventricular systolic 
function is normal.
 
Atria
The left atrium size is normal. The right atrium size is 
normal.
 
Aortic Valve
Mild aortic valve sclerosis. No aortic regurgitation is present. 
There is no aortic valvular stenosis.
 
Mitral Valve
Mild mitral annular calcification. Mitral valve leaflets are mildly 
thickened. Mild to moderate mitral regurgitation. No evidence of 
mitral valve stenosis.
 
Tricuspid Valve
The tricuspid valve is normal in structure. Mild tricuspid 
regurgitation.
 
 
 
[place] [address]
Phone:  [phone] 2 D/M-MODE ECHOCARDIOGRAM     
_______________________________________________________________________________
 
Name:         EDUARD LEMONS                 Room:                     REG CLI
M.R.#:    [id number]     Account #:     [id number]  
Admission:    21    Attend Phys:   Bhavin Scott MD
Discharge:                Date of Birth: [birth year]  
Date of Service: 21 1537  Report #:      [id number]
        [id number]
_______________________________________________________________________________
Pulmonic Valve
The pulmonary valve is normal in structure. Mild pulmonic 
regurgitation.
 
Great Vessels
The aortic root is normal in size. IVC is normal in size and 
collapses >50% with inspiration.
 
Pericardium
There is no pericardial effusion.
 
<Conclusion>
The left ventricle is normal size.
Left ventricular outflow tract gradient is present.
Mild to moderate concentric left ventricular hypertrophy.
Left ventricular systolic function is vigorous.
LVEF is >70%.
Grade I - abnormal relaxation pattern.
There is normal LV segmental wall motion.
Mild aortic valve sclerosis.
Mild mitral annular calcification.
Mitral valve leaflets are mildly thickened.
Mild to moderate mitral regurgitation.
Mild tricuspid regurgitation.
 
Mild pulmonic regurgitation.
IVC is normal in size and collapses >50% with 
inspiration.
 
Findings consistent with hypertrophic cardiomyopathy.
Mean gradient 75 mmHg.
 
 
 
 
 
 
 
 
 
 
 
 
 
  <ELECTRONICALLY SIGNED>
                                           By: Joshua Robison MD, FACC   
  21     1537
D: 21 1537   _____________________________________
T: 21 1537   Joshua Robison MD, FACC     /INF

## 2021-09-10 ENCOUNTER — HOSPITAL ENCOUNTER (EMERGENCY)
Dept: HOSPITAL 96 - M.ERS | Age: 81
Discharge: HOME | End: 2021-09-10
Payer: MEDICARE

## 2021-09-10 VITALS — DIASTOLIC BLOOD PRESSURE: 56 MMHG | SYSTOLIC BLOOD PRESSURE: 184 MMHG

## 2021-09-10 VITALS — BODY MASS INDEX: 24.66 KG/M2 | WEIGHT: 134 LBS | HEIGHT: 62 IN

## 2021-09-10 DIAGNOSIS — U07.1: Primary | ICD-10-CM

## 2021-09-10 DIAGNOSIS — Z90.711: ICD-10-CM

## 2021-09-10 DIAGNOSIS — E03.9: ICD-10-CM

## 2021-09-10 DIAGNOSIS — Z88.5: ICD-10-CM

## 2021-09-10 DIAGNOSIS — Z79.899: ICD-10-CM

## 2021-09-10 DIAGNOSIS — I10: ICD-10-CM

## 2021-09-10 DIAGNOSIS — Z90.49: ICD-10-CM

## 2021-10-27 ENCOUNTER — HOSPITAL ENCOUNTER (EMERGENCY)
Dept: HOSPITAL 96 - M.ERS | Age: 81
Discharge: HOME | End: 2021-10-27
Payer: MEDICARE

## 2021-10-27 VITALS — DIASTOLIC BLOOD PRESSURE: 53 MMHG | SYSTOLIC BLOOD PRESSURE: 158 MMHG

## 2021-10-27 VITALS — HEIGHT: 62 IN | WEIGHT: 120 LBS | BODY MASS INDEX: 22.08 KG/M2

## 2021-10-27 DIAGNOSIS — Z79.899: ICD-10-CM

## 2021-10-27 DIAGNOSIS — S01.81XA: Primary | ICD-10-CM
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
## 2021-10-27 DIAGNOSIS — Z90.711: ICD-10-CM

## 2021-10-27 DIAGNOSIS — E03.9: ICD-10-CM
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
## 2021-10-27 DIAGNOSIS — I10: ICD-10-CM
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
## 2021-10-27 DIAGNOSIS — S00.212A: ICD-10-CM
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
## 2021-10-27 DIAGNOSIS — S05.12XA: ICD-10-CM

## 2021-10-27 DIAGNOSIS — Z88.5: ICD-10-CM
